# Patient Record
Sex: MALE | Race: WHITE | URBAN - NONMETROPOLITAN AREA
[De-identification: names, ages, dates, MRNs, and addresses within clinical notes are randomized per-mention and may not be internally consistent; named-entity substitution may affect disease eponyms.]

---

## 2021-11-10 ENCOUNTER — PROCEDURE VISIT (OUTPATIENT)
Dept: ENT CLINIC | Age: 46
End: 2021-11-10
Payer: COMMERCIAL

## 2021-11-10 ENCOUNTER — OFFICE VISIT (OUTPATIENT)
Dept: ENT CLINIC | Age: 46
End: 2021-11-10
Payer: COMMERCIAL

## 2021-11-10 VITALS — WEIGHT: 230 LBS | HEIGHT: 71 IN | BODY MASS INDEX: 32.2 KG/M2

## 2021-11-10 DIAGNOSIS — H92.03 OTALGIA OF BOTH EARS: Primary | ICD-10-CM

## 2021-11-10 DIAGNOSIS — M26.622 ARTHRALGIA OF LEFT TEMPOROMANDIBULAR JOINT: Primary | ICD-10-CM

## 2021-11-10 PROCEDURE — 92567 TYMPANOMETRY: CPT | Performed by: AUDIOLOGIST

## 2021-11-10 PROCEDURE — 92553 AUDIOMETRY AIR & BONE: CPT | Performed by: AUDIOLOGIST

## 2021-11-10 PROCEDURE — 99203 OFFICE O/P NEW LOW 30 MIN: CPT | Performed by: PHYSICIAN ASSISTANT

## 2021-11-10 RX ORDER — ESCITALOPRAM OXALATE 10 MG/1
TABLET ORAL
COMMUNITY

## 2021-11-10 RX ORDER — METFORMIN HYDROCHLORIDE 750 MG/1
TABLET, EXTENDED RELEASE ORAL
COMMUNITY
Start: 2021-10-11

## 2021-11-10 RX ORDER — ATORVASTATIN CALCIUM 20 MG/1
TABLET, FILM COATED ORAL
COMMUNITY
Start: 2021-09-07

## 2021-11-10 RX ORDER — TESTOSTERONE CYPIONATE 200 MG/ML
INJECTION INTRAMUSCULAR
COMMUNITY

## 2021-11-10 ASSESSMENT — ENCOUNTER SYMPTOMS
VOICE CHANGE: 0
SORE THROAT: 0
EYE PAIN: 0
PHOTOPHOBIA: 0
FACIAL SWELLING: 0
SINUS PAIN: 0
SINUS PRESSURE: 0
TROUBLE SWALLOWING: 0
RHINORRHEA: 0

## 2021-11-10 NOTE — PROGRESS NOTES
History   Estrellita Pratt is a 40 y.o. male who presented to the clinic this date with complaints of bilateral ear pain. He noted pain primarily in his left ear and occasionally in his right. Symptoms have been ongoing for about a year. Recently, while he was eating, he had a pop with severe pain in his left ear. He noted decreased hearing and occasional tinnitus. Summary   Tympanometry consistent with normal TM mobility right and reduced TM mobility left. Pure tone testing indicates normal hearing bilaterally. Results   Otoscopy:    Right: Clear EAC/Normal TM   Left: Tympanosclerosis noted on TM    Audiometry:    Right: Hearing WNL     Left: Hearing WNL           Tympanometry:     Right: Type A   Left: Type As    Plan   Results of today's testing were discussed with Mr. Renay Corbin and the following recommendations were made:    1. Follow up with ENT as scheduled.         Audiogram and Acoustic Immittance

## 2021-11-10 NOTE — LETTER
[facility]  [address] [address] [address]  Phone: [phone]  Fax: [address] [place] [address], [place]      November 10, 2021     Patient: Shante Shoulders   MR Number: <[id number]>   YOB: 1975   Date of Visit: 11/10/2021       Dear Dr. Katheryn Gonzales:    Thank you for referring Yordy Piper to me for evaluation/treatment. Below are the relevant portions of my assessment and plan of care. If you have questions, please do not hesitate to call me. I look forward to following Kolton Trejo along with you.     Sincerely,        J Luis Carter PA-C    CC providers:  [address]  Via Fax: [phone]

## 2021-11-10 NOTE — PROGRESS NOTES
[facility] OTOLARYNGOLOGY/ENT  Vandana Pichardo is a pleasant 55-year-old  male that was referred by Dixon Dye due to problems with chronic left-sided ear pain. He reports over the last year he has had sporadic problems with pain to the left jaw and left ear. He specifically recalls eating supper one night and having a popping sensation that occurred to the left jaw that resulted in pain for a day or 2. He admits to grinding his teeth that was confirmed by his wife. He denies any drainage from a external canal of the ear. Audiology studies were completed today were reviewed with the patient. Patient was noted with normal hearing with no deficit bilaterally. Tympanogram was type As to the left and type A to the right. Allergies: Patient has no known allergies. Current Outpatient Medications   Medication Sig Dispense Refill    metFORMIN (GLUCOPHAGE-XR) 750 MG extended release tablet       escitalopram (LEXAPRO) 10 MG tablet escitalopram 10 mg tablet      atorvastatin (LIPITOR) 20 MG tablet       testosterone cypionate (DEPOTESTOTERONE CYPIONATE) 200 MG/ML injection testosterone cypionate 200 mg/mL intramuscular oil       No current facility-administered medications for this visit. Past Surgical History:   Procedure Laterality Date    CARPAL TUNNEL RELEASE      LASIK         History reviewed. No pertinent past medical history. History reviewed. No pertinent family history. Social History     Tobacco Use    Smoking status: Never Smoker    Smokeless tobacco: Never Used   Substance Use Topics    Alcohol use: Not Currently           REVIEW OF SYSTEMS:  all other systems reviewed and are negative  Review of Systems   Constitutional: Negative for chills and fever. HENT: Negative for congestion, dental problem, ear discharge, ear pain, facial swelling, hearing loss, postnasal drip, rhinorrhea, sinus pressure, sinus pain, sore throat, tinnitus, trouble swallowing and voice change.     Eyes: Negative for photophobia and pain. Neurological: Negative for dizziness and headaches. Comments:     PHYSICAL EXAM:    Ht 5' 11\" (1.803 m)   Wt 230 lb (104.3 kg)   BMI 32.08 kg/m²   Body mass index is 32.08 kg/m². General Appearance: well developed  and well nourished  Head/ Face: normocephalic and atraumatic  Vocal Quality: good/ normal  Ears: Right Ear: External: external ears normal Otoscopy Ear Canal: canal clear Otoscopy TM: TM's normal and TM's mobile Left Ear: External: external ears normal Otoscopy Ear Canal: canal clear Otoscopy TM: TM's normal and TM's mobile  Hearing: grossly intact  Nose: nares normal and septum midline  Neck: supple and adenopathy none palpable  Thyroid: normal and nodules No   The patient was noted with positive reproductive tenderness to the left TMJ region with no evidence of a click. No sinus tenderness was appreciated to percussion over the frontal and maxillary sinuses. Assessment & Plan:    Problem List Items Addressed This Visit     Arthralgia of left temporomandibular joint - Primary     Left TMJ arthralgiaas etiology of ear pain  Plan: I recommended the patient to see his dentist for a bite block to see if this will help with his discomfort. I advised the patient that ultimately he would need to be referred to Dr. Rafita Monte for further evaluation if his pain continues. At this point the patient is follow-up with me as needed. No orders of the defined types were placed in this encounter. No orders of the defined types were placed in this encounter. Electronically signed by Luz Esparza PA-C on 11/10/21 at 11:31 AM CST          Please note that this chart was generated using dragon dictation software. Although every effort was made to ensure the accuracy of this automated transcription, some errors in transcription may have occurred.

## 2025-03-06 ENCOUNTER — TRANSCRIBE ORDERS (OUTPATIENT)
Dept: ADMINISTRATIVE | Age: 50
End: 2025-03-06

## 2025-03-06 DIAGNOSIS — R10.11 RIGHT UPPER QUADRANT PAIN: Primary | ICD-10-CM

## 2025-03-26 ENCOUNTER — HOSPITAL ENCOUNTER (OUTPATIENT)
Dept: NUCLEAR MEDICINE | Age: 50
Discharge: HOME | End: 2025-03-28
Payer: COMMERCIAL

## 2025-03-26 DIAGNOSIS — R10.11 RIGHT UPPER QUADRANT PAIN: ICD-10-CM

## 2025-03-26 PROCEDURE — 78227 HEPATOBIL SYST IMAGE W/DRUG: CPT

## 2025-03-26 PROCEDURE — A9537 TC99M MEBROFENIN: HCPCS | Performed by: NURSE PRACTITIONER

## 2025-03-26 PROCEDURE — 3430000000 HC RX DIAGNOSTIC RADIOPHARMACEUTICAL: Performed by: NURSE PRACTITIONER

## 2025-03-26 RX ADMIN — Medication 5 MILLICURIE: at 11:45
